# Patient Record
Sex: FEMALE | Race: BLACK OR AFRICAN AMERICAN | NOT HISPANIC OR LATINO | Employment: OTHER | URBAN - METROPOLITAN AREA
[De-identification: names, ages, dates, MRNs, and addresses within clinical notes are randomized per-mention and may not be internally consistent; named-entity substitution may affect disease eponyms.]

---

## 2019-05-03 PROBLEM — M87.051 AVASCULAR NECROSIS OF BONE OF RIGHT HIP: Status: ACTIVE | Noted: 2019-05-03

## 2019-07-01 PROBLEM — F14.10 COCAINE ABUSE: Status: ACTIVE | Noted: 2019-07-01

## 2019-07-01 PROBLEM — M87.051 AVASCULAR NECROSIS OF HIP, RIGHT: Status: ACTIVE | Noted: 2019-05-03

## 2023-08-18 PROBLEM — S76.309A HAMSTRING INJURY: Status: ACTIVE | Noted: 2023-08-18

## 2023-12-10 PROBLEM — R06.03 ACUTE RESPIRATORY DISTRESS: Status: ACTIVE | Noted: 2023-12-10

## 2023-12-10 PROBLEM — J38.7 LARYNGEAL MASS: Status: ACTIVE | Noted: 2023-12-10

## 2023-12-10 PROBLEM — Z78.9 ALCOHOL USE: Status: ACTIVE | Noted: 2023-12-10

## 2023-12-10 PROBLEM — R06.02 SHORTNESS OF BREATH: Status: ACTIVE | Noted: 2023-12-10

## 2023-12-11 PROBLEM — Z93.0 TRACHEOSTOMY IN PLACE: Status: ACTIVE | Noted: 2023-12-11

## 2023-12-11 PROBLEM — R07.9 CHEST PAIN: Status: ACTIVE | Noted: 2023-12-11

## 2023-12-11 PROBLEM — F17.200 SMOKING: Status: ACTIVE | Noted: 2023-12-11

## 2023-12-12 PROBLEM — R00.1 SINUS BRADYCARDIA: Status: ACTIVE | Noted: 2023-12-12

## 2023-12-15 PROBLEM — J95.02: Status: ACTIVE | Noted: 2023-12-15

## 2023-12-17 PROBLEM — K59.00 CONSTIPATION: Status: ACTIVE | Noted: 2023-12-17

## 2023-12-19 PROBLEM — C32.9 LARYNX CANCER: Status: ACTIVE | Noted: 2023-12-19

## 2023-12-29 PROBLEM — F41.9 ANXIETY: Status: ACTIVE | Noted: 2023-12-29

## 2023-12-29 PROBLEM — E87.1 HYPONATREMIA: Status: ACTIVE | Noted: 2023-12-29

## 2023-12-29 PROBLEM — R05.3 CHRONIC COUGH: Status: ACTIVE | Noted: 2023-12-29

## 2024-01-09 PROBLEM — F19.90 POLYSUBSTANCE USE DISORDER: Status: ACTIVE | Noted: 2024-01-09

## 2024-01-09 PROBLEM — R03.0 BLOOD PRESSURE ELEVATED WITHOUT HISTORY OF HTN: Status: ACTIVE | Noted: 2024-01-09

## 2024-01-10 PROBLEM — F14.90 COCAINE USE: Status: ACTIVE | Noted: 2024-01-10

## 2024-01-10 PROBLEM — C44.92 SCC (SQUAMOUS CELL CARCINOMA): Status: ACTIVE | Noted: 2024-01-10

## 2024-01-17 PROBLEM — Z93.0 TRACHEOSTOMY DEPENDENCE: Status: RESOLVED | Noted: 2023-12-11 | Resolved: 2024-01-17

## 2024-01-17 PROBLEM — C32.1 SQUAMOUS CELL CARCINOMA OF SUPRAGLOTTIS: Status: ACTIVE | Noted: 2024-01-17

## 2024-01-17 PROBLEM — J38.7 LARYNGEAL MASS: Status: RESOLVED | Noted: 2023-12-10 | Resolved: 2024-01-17

## 2024-02-21 ENCOUNTER — PATIENT OUTREACH (OUTPATIENT)
Dept: EMERGENCY MEDICINE | Facility: HOSPITAL | Age: 64
End: 2024-02-21

## 2024-02-22 PROBLEM — E03.8 OTHER SPECIFIED HYPOTHYROIDISM: Status: ACTIVE | Noted: 2024-02-22

## 2024-02-22 PROBLEM — R03.0 BLOOD PRESSURE ELEVATED WITHOUT HISTORY OF HTN: Status: RESOLVED | Noted: 2024-01-09 | Resolved: 2024-02-22

## 2024-02-22 PROBLEM — I10 HTN (HYPERTENSION): Status: ACTIVE | Noted: 2024-02-22

## 2024-03-26 PROBLEM — K40.90 INGUINAL HERNIA OF LEFT SIDE WITHOUT OBSTRUCTION OR GANGRENE: Status: ACTIVE | Noted: 2024-03-26

## 2024-05-10 PROBLEM — R06.02 SOB (SHORTNESS OF BREATH): Status: ACTIVE | Noted: 2024-05-10

## 2024-05-10 PROBLEM — R06.02 SOB (SHORTNESS OF BREATH): Status: RESOLVED | Noted: 2024-05-10 | Resolved: 2024-05-10

## 2024-05-10 PROBLEM — E03.9 ACQUIRED HYPOTHYROIDISM: Status: ACTIVE | Noted: 2024-02-22

## 2024-05-10 PROBLEM — R10.9 ABDOMINAL PAIN: Status: ACTIVE | Noted: 2024-05-10

## 2024-05-10 PROBLEM — R13.10 DYSPHAGIA: Status: ACTIVE | Noted: 2024-05-10

## 2024-06-25 ENCOUNTER — PATIENT OUTREACH (OUTPATIENT)
Dept: ADMINISTRATIVE | Facility: HOSPITAL | Age: 64
End: 2024-06-25

## 2024-10-21 ENCOUNTER — OUTPATIENT CASE MANAGEMENT (OUTPATIENT)
Dept: ADMINISTRATIVE | Facility: OTHER | Age: 64
End: 2024-10-21

## 2024-10-21 NOTE — PROGRESS NOTES
Received a Detailed Written Order for Medical Supplies from [facility] requesting practitioner's signature for continued assistance; Sw obtained practitioner's signature and faxed information at [phone] for continued assistance; Sw will continue to follow pt to assist with needs and concerns.    GIORGI Hallman    Ext. [phone]  Pager #[phone]

## 2024-10-22 ENCOUNTER — OUTPATIENT CASE MANAGEMENT (OUTPATIENT)
Dept: ADMINISTRATIVE | Facility: OTHER | Age: 64
End: 2024-10-22

## 2024-10-22 NOTE — PROGRESS NOTES
Called David at [phone], spoke with Milagros regarding receiving Detailed Written Order (DWO) and was told that information was received for continued assistance with trac/lryn supplies; Sw will continue to follow pt to assist with needs and concerns.    GIORGI Hallman    Ext. [phone]  Pager #[phone]

## 2024-10-30 ENCOUNTER — OUTPATIENT CASE MANAGEMENT (OUTPATIENT)
Dept: ADMINISTRATIVE | Facility: OTHER | Age: 64
End: 2024-10-30

## 2024-10-30 ENCOUNTER — SOCIAL WORK (OUTPATIENT)
Dept: ADMINISTRATIVE | Facility: OTHER | Age: 64
End: 2024-10-30

## 2024-11-04 ENCOUNTER — OUTPATIENT CASE MANAGEMENT (OUTPATIENT)
Dept: ADMINISTRATIVE | Facility: OTHER | Age: 64
End: 2024-11-04

## 2024-11-04 NOTE — PROGRESS NOTES
Called [phone] and spoke with Brandi to follow up with her in regards to receiving the signed Detailed Written Order for Respiratory Supplies and she confirmed receipt of order. Will continue to assist as needed.       David Barrios DON    Ext [phone]/Pager [phone]

## 2024-11-06 ENCOUNTER — OUTPATIENT CASE MANAGEMENT (OUTPATIENT)
Dept: ADMINISTRATIVE | Facility: OTHER | Age: 64
End: 2024-11-06

## 2024-11-06 NOTE — PROGRESS NOTES
Consult received from MD in regards to pt needing laryngectomy supplies due to pt laryngectomy tube has broken and she needs new supplies. Called [phone] and pt daughter-(Adrienne Andersen) answered. Discussed with pt daughter about consult received to assist pt with her broken laryngectomy tube and was told by the daughter that it is not the janet tube that is broken it is pt suction machine. Pt daughter reports that the machine has been broke for 30 days. Informed pt daughter that she will need to contact [facility] and let them know about the machine and was provided with the contact number. No further needs were noted at that time. Will continue to follow and assist as needed.       David Barrios LMSW    Ext [phone]/Pager [phone]